# Patient Record
Sex: FEMALE | Race: OTHER | Employment: UNEMPLOYED | URBAN - METROPOLITAN AREA
[De-identification: names, ages, dates, MRNs, and addresses within clinical notes are randomized per-mention and may not be internally consistent; named-entity substitution may affect disease eponyms.]

---

## 2022-07-21 ENCOUNTER — TELEPHONE (OUTPATIENT)
Dept: FAMILY PLANNING/WOMEN'S HEALTH CLINIC | Age: 67
End: 2022-07-21

## 2022-07-21 NOTE — TELEPHONE ENCOUNTER
Patient referred by [employer]. Spoke with patient on phone to screen for EWL. Overview of program given. Eligibility criteria reviewed. Client does meet all qualifications and does want to enter into program. Patient has been scheduled for EWL appointment.  Pap is not needed per patient last pap was done 1 year ago in [facility].

## 2022-07-25 ENCOUNTER — TRANSCRIBE ORDER (OUTPATIENT)
Dept: SCHEDULING | Age: 67
End: 2022-07-25

## 2022-07-25 DIAGNOSIS — Z12.31 VISIT FOR SCREENING MAMMOGRAM: Primary | ICD-10-CM

## 2022-07-28 ENCOUNTER — HOSPITAL ENCOUNTER (OUTPATIENT)
Dept: MAMMOGRAPHY | Age: 67
Discharge: HOME | End: 2022-07-28

## 2022-07-28 ENCOUNTER — OFFICE VISIT (OUTPATIENT)
Dept: FAMILY PLANNING/WOMEN'S HEALTH CLINIC | Age: 67
End: 2022-07-28

## 2022-07-28 DIAGNOSIS — Z98.82 H/O BILATERAL BREAST IMPLANTS: Primary | ICD-10-CM

## 2022-07-28 DIAGNOSIS — Z12.31 VISIT FOR SCREENING MAMMOGRAM: ICD-10-CM

## 2022-07-28 DIAGNOSIS — N64.4 BREAST PAIN IN FEMALE: ICD-10-CM

## 2022-07-28 DIAGNOSIS — Z01.419 ENCOUNTER FOR WELL WOMAN EXAM: ICD-10-CM

## 2022-07-28 PROCEDURE — 77063 BREAST TOMOSYNTHESIS BI: CPT

## 2022-07-28 NOTE — PROGRESS NOTES
EVERY WOMANS LIFE HISTORY QUESTIONNAIRE       No Yes Comments   Has a doctor ever seen or felt anything wrong with your breast? [x]                                  []                                     Have you ever had a breast biopsy? [x]                                  []                                          When and where was last mammogram performed? 2  years ago in [facility]    Have you ever been told that there was a problem on your mammogram?   No Yes Comments   []                                  [x]                                  Pt was told she had mass in both breast, feels the lump to the right breast, left irritation. These were felt in [facility] and seen on mammogram. Pt states they didn't explain results and she is not sure what they found. Do you have breast implants? No Yes Comments   []                                  [x]                                  Implants inserted [facility]     When was your last Pap test performed? 2 years ago in [facility]    Have you ever had an abnormal Pap test?   No Yes Comments   []                                  [x]                                  2005 [facility], Dysplasia 1     Have you had a hysterectomy? No Yes Comments (why)   [x]                                  []                                       Have you been through menopause? No Yes Date of LMP   []                                  [x]                                  Pt states she stopped having periods at age 20years old. Pt experienced no symptoms of menopause. Did your mother take JYOTHI? No Yes Unknown   []                                  []                                  x     Do you have a history of HIV exposure?   No Yes    [x]                                  []                                       Have you ever been diagnosed with any type of Cancer   No Yes Comments (type,when,where,type of treatment   [x]                                  [] Has a family member been diagnosed with breast or ovarian cancer? No Yes Comments (which family members, and type   [x]                                  []                                       Are you taking hormone replacement therapy (HRT)     No Yes Comments   [x]                                  []                                  Possibly hormones injections in [facility].     How many times have you been pregnant? 12      Number of live births ? 6    Are you experiencing any of the following? No Yes Comments   Nipple Discharge [x]                                  []                                     Breast Lump/Masses [x]                                  []                                     Breast Skin Changes [x]                                  []                                          No Yes Comments   Vaginal Discharge []                                  [x]                                  White/clear discharge, denies odor   Abnormal/unusual vaginal bleeding [x]                                  []                                         Are you experiencing any other health problems?    -Pt's mother had cervical cancer.    -Pt had burning with urination,has had testing with provider. Age at first period? 14  Age at first birth? 14    Ht--5'    Wt--140    The [facility]  number is [id number].

## 2022-07-28 NOTE — PROGRESS NOTES
Patient does not have Medicare at this time. Patient was encouraged to go to the department of  and ask about this.  Erica Ashraf RN

## 2022-07-28 NOTE — PROGRESS NOTES
Assessment/Plan:    Diagnoses and all orders for this visit:    1. H/O bilateral breast implants    2. Breast pain in female    3. Encounter for well woman exam            SHENA Ge expressed understanding of this plan. An AVS was printed and given to the patient.      ----------------------------------------------------------------------    Chief Complaint   Patient presents with    Well Woman       History of Present Illness:  New pt to [facility], here for annual well woman  Her gyn hx is , menopause \"years ago\"  She was having \"upper arm pain and she went to the doctor in [facility] and they put in her breast implants to help with the pain\". She thinks that this actually did help some but the breasts are painful all the time again. This was 6 years ago. She had not been having regular mammograms before the implants. It sounds like she has had normal mammograms since then  She told my nurse that she had some vaginal discharge but no vaginal/pelvic pain. During our visit, she did not mention this to me as she was speaking about the hormone tx \"testosterone\" that she was using as an injection for the pain she was having, but then her daughter encouraged her to stop the injections due to risk of \"cancer\"  She is currently single. She told me about the daughter in [place] who had an accident and will need surgery today on her hand and how she might need to go there to help      History reviewed. No pertinent past medical history. No Known Allergies    Social History     Tobacco Use    Smoking status: Never    Smokeless tobacco: Never       History reviewed. No pertinent family history. Physical Exam:     There were no vitals taken for this visit. A&Ox3  WDWN NAD  Respirations normal and non labored    Breast exam- francesco neg for mass, tenderness. She has francesco implants intact. There are no masses felt  Pelvic exam- ext neg for lesion or discharge.  Cervix and vagina show no abnl lesion or discharge.  Uterus and adnexal exam neg for mass or tenderness